# Patient Record
Sex: FEMALE | Race: WHITE | NOT HISPANIC OR LATINO | Employment: FULL TIME | URBAN - NONMETROPOLITAN AREA
[De-identification: names, ages, dates, MRNs, and addresses within clinical notes are randomized per-mention and may not be internally consistent; named-entity substitution may affect disease eponyms.]

---

## 2018-05-24 ENCOUNTER — OFFICE VISIT (OUTPATIENT)
Dept: SURGERY | Facility: CLINIC | Age: 53
End: 2018-05-24

## 2018-05-24 VITALS
BODY MASS INDEX: 26.11 KG/M2 | WEIGHT: 133 LBS | TEMPERATURE: 98 F | HEIGHT: 60 IN | RESPIRATION RATE: 16 BRPM | SYSTOLIC BLOOD PRESSURE: 147 MMHG | DIASTOLIC BLOOD PRESSURE: 80 MMHG | HEART RATE: 85 BPM

## 2018-05-24 DIAGNOSIS — Z12.11 ENCOUNTER FOR SCREENING COLONOSCOPY: Primary | ICD-10-CM

## 2018-05-24 PROCEDURE — S0285 CNSLT BEFORE SCREEN COLONOSC: HCPCS | Performed by: SURGERY

## 2018-05-24 RX ORDER — AMOXICILLIN AND CLAVULANATE POTASSIUM 875; 125 MG/1; MG/1
TABLET, FILM COATED ORAL
Refills: 0 | Status: ON HOLD | COMMUNITY
Start: 2018-05-17 | End: 2018-09-10

## 2018-05-24 RX ORDER — OLOPATADINE HYDROCHLORIDE 2 MG/ML
SOLUTION/ DROPS OPHTHALMIC DAILY
COMMUNITY
End: 2021-10-29

## 2018-05-24 NOTE — PROGRESS NOTES
5/24/2018    Patient Information  Vidhya Chou  [address] [facility] [place] [address]  1965  [phone] (home)     Chief Complaint   Patient presents with   • Colonoscopy     Referred for Colonoscopy       HPI  Patient is a 52-year-old white female referred by cindy Damon nurse practitioner, for a screening colonoscopy.  She has occasional constipation but this is been her normal course.  There is no family history of colon cancer    Review of Systems:  The Past medical history, family history, social history, medication list, allergies, and Review of Systems has been reviewed and confirmed.    General: negative  Integumentary: negative  Eyes: discharge from eyes  ENT: earache  Respiratory: wheezing  Gastrointestinal: constipation  Cardiovascular: negative  Neurological: negative  Psychiatric: negative  Hematologic/Lymphatic: negative  Genitourinary: negative  Musculoskeletal: negative  Endocrine: negative  Breasts: negative      There is no problem list on file for this patient.        Past Medical History:   Diagnosis Date   • Asthma    • Hyperlipidemia          Past Surgical History:   Procedure Laterality Date   • TUBAL ABDOMINAL LIGATION           Family History   Problem Relation Age of Onset   • Hypertension Mother    • Heart disease Mother    • Diabetes Mother    • Hypertension Father    • Heart disease Father          Social History   Substance Use Topics   • Smoking status: Never Smoker   • Smokeless tobacco: Never Used   • Alcohol use Yes       Current Outpatient Prescriptions   Medication Sig Dispense Refill   • Levocetirizine Dihydrochloride (XYZAL PO) Take  by mouth.     • olopatadine (PATADAY) 0.2 % solution ophthalmic solution Daily.     • Pseudoephedrine HCl (SUDAFED 12 HOUR PO) Take  by mouth.     • amoxicillin-clavulanate (AUGMENTIN) 875-125 MG per tablet TK 1 T PO  BID  0     No current facility-administered medications for this visit.          Allergies  Patient has no known  "allergies.    /80   Pulse 85   Temp 98 °F (36.7 °C)   Resp 16   Ht 152.4 cm (60\")   Wt 60.3 kg (133 lb)   BMI 25.97 kg/m²      Physical Exam   Constitutional: She is oriented to person, place, and time. She appears well-developed and well-nourished. No distress.   HENT:   Head: Normocephalic.   Right Ear: External ear normal.   Left Ear: External ear normal.   Nose: Nose normal.   Mouth/Throat: Oropharynx is clear and moist.   Eyes: Conjunctivae and EOM are normal. Right eye exhibits no discharge. Left eye exhibits no discharge.   Neck: Normal range of motion. No JVD present. No tracheal deviation present. No thyromegaly present.   Cardiovascular: Normal rate, regular rhythm, normal heart sounds and intact distal pulses.  Exam reveals no gallop and no friction rub.    No murmur heard.  Pulmonary/Chest: Effort normal and breath sounds normal. No stridor. No respiratory distress. She has no wheezes. She has no rales. She exhibits no tenderness.   Abdominal: Soft. Bowel sounds are normal. She exhibits no distension and no mass. There is no tenderness. There is no rebound and no guarding. No hernia.   Genitourinary: Rectal exam shows guaiac negative stool.   Musculoskeletal: Normal range of motion. She exhibits no edema, tenderness or deformity.   Lymphadenopathy:     She has no cervical adenopathy.   Neurological: She is alert and oriented to person, place, and time. She has normal reflexes. She displays normal reflexes. No cranial nerve deficit. She exhibits normal muscle tone. Coordination normal.   Skin: Skin is warm and dry. No rash noted. She is not diaphoretic. No erythema. No pallor.   Psychiatric: She has a normal mood and affect. Her behavior is normal. Judgment and thought content normal.                 ASSESSMENT  In need of screening colonoscopy        PLAN    Screening colonoscopy.  Patient wants to wait until the fall to have this done.  She is instructed to call back at that time      I " advised the patient of the risks in continuing to use tobacco, and I provided this patient with smoking cessation educational materials.    During this visit, I spent less than minutes counseling the patient regarding smoking cessation.       Patient's Body mass index is 25.97 kg/m². BMI is within normal parameters. No follow-up required.           This document signed by Marco A Damon MD May 24, 2018 3:51 PM

## 2018-08-29 ENCOUNTER — PREP FOR SURGERY (OUTPATIENT)
Dept: OTHER | Facility: HOSPITAL | Age: 53
End: 2018-08-29

## 2018-08-29 DIAGNOSIS — Z12.11 ENCOUNTER FOR SCREENING COLONOSCOPY: Primary | ICD-10-CM

## 2018-09-07 ENCOUNTER — TELEPHONE (OUTPATIENT)
Dept: SURGERY | Facility: CLINIC | Age: 53
End: 2018-09-07

## 2018-09-07 NOTE — H&P
Vidhya Chou  [address] [place] [id number]  1965  [phone] (home)           Chief Complaint   Patient presents with   • Colonoscopy       Referred for Colonoscopy         HPI  Patient is a 52-year-old white female referred by cindy Damon nurse practitioner, for a screening colonoscopy.  She has occasional constipation but this is been her normal course.  There is no family history of colon cancer     Review of Systems:  The Past medical history, family history, social history, medication list, allergies, and Review of Systems has been reviewed and confirmed.     General: negative  Integumentary: negative  Eyes: discharge from eyes  ENT: earache  Respiratory: wheezing  Gastrointestinal: constipation  Cardiovascular: negative  Neurological: negative  Psychiatric: negative  Hematologic/Lymphatic: negative  Genitourinary: negative  Musculoskeletal: negative  Endocrine: negative  Breasts: negative        There is no problem list on file for this patient.           Medical History        Past Medical History:   Diagnosis Date   • Asthma     • Hyperlipidemia                 Surgical History         Past Surgical History:   Procedure Laterality Date   • TUBAL ABDOMINAL LIGATION                         Family History   Problem Relation Age of Onset   • Hypertension Mother     • Heart disease Mother     • Diabetes Mother     • Hypertension Father     • Heart disease Father                   Social History   Substance Use Topics   • Smoking status: Never Smoker   • Smokeless tobacco: Never Used   • Alcohol use Yes         Current Medications   Current Outpatient Prescriptions   Medication Sig Dispense Refill   • Levocetirizine Dihydrochloride (XYZAL PO) Take  by mouth.       • olopatadine (PATADAY) 0.2 % solution ophthalmic solution Daily.       • Pseudoephedrine HCl (SUDAFED 12 HOUR PO) Take  by mouth.       • amoxicillin-clavulanate (AUGMENTIN) 875-125 MG per tablet TK 1 T PO  BID   0      No current  "facility-administered medications for this visit.                Allergies  Patient has no known allergies.     /80   Pulse 85   Temp 98 °F (36.7 °C)   Resp 16   Ht 152.4 cm (60\")   Wt 60.3 kg (133 lb)   BMI 25.97 kg/m²       Physical Exam   Constitutional: She is oriented to person, place, and time. She appears well-developed and well-nourished. No distress.   HENT:   Head: Normocephalic.   Right Ear: External ear normal.   Left Ear: External ear normal.   Nose: Nose normal.   Mouth/Throat: Oropharynx is clear and moist.   Eyes: Conjunctivae and EOM are normal. Right eye exhibits no discharge. Left eye exhibits no discharge.   Neck: Normal range of motion. No JVD present. No tracheal deviation present. No thyromegaly present.   Cardiovascular: Normal rate, regular rhythm, normal heart sounds and intact distal pulses.  Exam reveals no gallop and no friction rub.    No murmur heard.  Pulmonary/Chest: Effort normal and breath sounds normal. No stridor. No respiratory distress. She has no wheezes. She has no rales. She exhibits no tenderness.   Abdominal: Soft. Bowel sounds are normal. She exhibits no distension and no mass. There is no tenderness. There is no rebound and no guarding. No hernia.   Genitourinary: Rectal exam shows guaiac negative stool.   Musculoskeletal: Normal range of motion. She exhibits no edema, tenderness or deformity.   Lymphadenopathy:     She has no cervical adenopathy.   Neurological: She is alert and oriented to person, place, and time. She has normal reflexes. She displays normal reflexes. No cranial nerve deficit. She exhibits normal muscle tone. Coordination normal.   Skin: Skin is warm and dry. No rash noted. She is not diaphoretic. No erythema. No pallor.   Psychiatric: She has a normal mood and affect. Her behavior is normal. Judgment and thought content normal.                     Assessment/Plan       ASSESSMENT  In need of screening " colonoscopy           PLAN     Screening colonoscopy.

## 2018-09-10 ENCOUNTER — ANESTHESIA (OUTPATIENT)
Dept: PERIOP | Facility: HOSPITAL | Age: 53
End: 2018-09-10

## 2018-09-10 ENCOUNTER — HOSPITAL ENCOUNTER (OUTPATIENT)
Facility: HOSPITAL | Age: 53
Setting detail: HOSPITAL OUTPATIENT SURGERY
Discharge: HOME | End: 2018-09-10
Attending: SURGERY | Admitting: ANESTHESIOLOGY

## 2018-09-10 ENCOUNTER — ANESTHESIA EVENT (OUTPATIENT)
Dept: PERIOP | Facility: HOSPITAL | Age: 53
End: 2018-09-10

## 2018-09-10 VITALS
BODY MASS INDEX: 25.52 KG/M2 | HEIGHT: 60 IN | HEART RATE: 70 BPM | RESPIRATION RATE: 18 BRPM | DIASTOLIC BLOOD PRESSURE: 88 MMHG | TEMPERATURE: 97 F | SYSTOLIC BLOOD PRESSURE: 152 MMHG | OXYGEN SATURATION: 98 % | WEIGHT: 130 LBS

## 2018-09-10 PROBLEM — Z12.11 ENCOUNTER FOR SCREENING COLONOSCOPY: Status: RESOLVED | Noted: 2018-08-29 | Resolved: 2018-09-10

## 2018-09-10 PROCEDURE — 45378 DIAGNOSTIC COLONOSCOPY: CPT | Performed by: SURGERY

## 2018-09-10 PROCEDURE — 25010000002 PROPOFOL 1000 MG/ML EMULSION: Performed by: NURSE ANESTHETIST, CERTIFIED REGISTERED

## 2018-09-10 PROCEDURE — 25010000002 PROPOFOL 10 MG/ML EMULSION: Performed by: NURSE ANESTHETIST, CERTIFIED REGISTERED

## 2018-09-10 RX ORDER — FENTANYL CITRATE 50 UG/ML
50 INJECTION, SOLUTION INTRAMUSCULAR; INTRAVENOUS
Status: CANCELLED | OUTPATIENT
Start: 2018-09-10

## 2018-09-10 RX ORDER — LIDOCAINE HYDROCHLORIDE 20 MG/ML
INJECTION, SOLUTION INFILTRATION; PERINEURAL AS NEEDED
Status: DISCONTINUED | OUTPATIENT
Start: 2018-09-10 | End: 2018-09-10 | Stop reason: SURG

## 2018-09-10 RX ORDER — IPRATROPIUM BROMIDE AND ALBUTEROL SULFATE 2.5; .5 MG/3ML; MG/3ML
3 SOLUTION RESPIRATORY (INHALATION) ONCE AS NEEDED
Status: CANCELLED | OUTPATIENT
Start: 2018-09-10

## 2018-09-10 RX ORDER — ONDANSETRON 2 MG/ML
4 INJECTION INTRAMUSCULAR; INTRAVENOUS ONCE AS NEEDED
Status: CANCELLED | OUTPATIENT
Start: 2018-09-10

## 2018-09-10 RX ORDER — SODIUM CHLORIDE 0.9 % (FLUSH) 0.9 %
1-10 SYRINGE (ML) INJECTION AS NEEDED
Status: DISCONTINUED | OUTPATIENT
Start: 2018-09-10 | End: 2018-09-10 | Stop reason: HOSPADM

## 2018-09-10 RX ORDER — SODIUM CHLORIDE, SODIUM LACTATE, POTASSIUM CHLORIDE, CALCIUM CHLORIDE 600; 310; 30; 20 MG/100ML; MG/100ML; MG/100ML; MG/100ML
125 INJECTION, SOLUTION INTRAVENOUS CONTINUOUS
Status: DISCONTINUED | OUTPATIENT
Start: 2018-09-10 | End: 2018-09-10 | Stop reason: HOSPADM

## 2018-09-10 RX ORDER — PROPOFOL 10 MG/ML
VIAL (ML) INTRAVENOUS AS NEEDED
Status: DISCONTINUED | OUTPATIENT
Start: 2018-09-10 | End: 2018-09-10 | Stop reason: SURG

## 2018-09-10 RX ADMIN — SODIUM CHLORIDE, POTASSIUM CHLORIDE, SODIUM LACTATE AND CALCIUM CHLORIDE: 600; 310; 30; 20 INJECTION, SOLUTION INTRAVENOUS at 10:43

## 2018-09-10 RX ADMIN — PROPOFOL 80 MG: 10 INJECTION, EMULSION INTRAVENOUS at 10:44

## 2018-09-10 RX ADMIN — LIDOCAINE HYDROCHLORIDE 40 MG: 20 INJECTION, SOLUTION INFILTRATION; PERINEURAL at 10:44

## 2018-09-10 RX ADMIN — PROPOFOL 150 MCG/KG/MIN: 10 INJECTION, EMULSION INTRAVENOUS at 10:44

## 2018-09-10 NOTE — ANESTHESIA POSTPROCEDURE EVALUATION
Patient: Vidhya Chou    Procedure Summary     Date:  09/10/18 Room / Location:  [facility] OR  /  [facility] OR    Anesthesia Start:  1043 Anesthesia Stop:  1104    Procedure:  COLONOSCOPY (N/A ) Diagnosis:       Encounter for screening colonoscopy      (Encounter for screening colonoscopy [Z12.11])    Surgeon:  Marco A Damon MD Provider:  Derick Chase MD    Anesthesia Type:  general ASA Status:  2          Anesthesia Type: general  Last vitals  BP   157/80 (09/10/18 0919)   Temp   97.7 °F (36.5 °C) (09/10/18 0919)   Pulse   81 (09/10/18 0919)   Resp   20 (09/10/18 0919)     SpO2   98 % (09/10/18 0919)     Post Anesthesia Care and Evaluation    Patient location during evaluation: PHASE II  Patient participation: complete - patient participated  Level of consciousness: awake and alert  Pain score: 1  Pain management: adequate  Airway patency: patent  Anesthetic complications: No anesthetic complications  PONV Status: controlled  Cardiovascular status: acceptable  Respiratory status: acceptable  Hydration status: acceptable

## 2018-09-10 NOTE — ANESTHESIA PREPROCEDURE EVALUATION
Anesthesia Evaluation     Patient summary reviewed and Nursing notes reviewed   no history of anesthetic complications:  NPO Solid Status: > 8 hours             Airway   Mallampati: II  TM distance: >3 FB  Neck ROM: full  no difficulty expected  Dental - normal exam     Pulmonary - normal exam   (+) asthma,   (-) not a smoker  Cardiovascular - normal exam  Exercise tolerance: good (4-7 METS)    NYHA Classification: II    (+) hyperlipidemia,   (-) hypertension, past MI, dysrhythmias, angina, CHF      Neuro/Psych- negative ROS  (-) seizures, CVA  GI/Hepatic/Renal/Endo - negative ROS   (-) GERD, liver disease, no renal disease, diabetes, hypothyroidism    Musculoskeletal (-) negative ROS    Abdominal  - normal exam    Bowel sounds: normal.   Substance History - negative use     OB/GYN negative ob/gyn ROS         Other - negative ROS       (-) arthritis                  Anesthesia Plan    ASA 2     general     intravenous induction   Anesthetic plan, all risks, benefits, and alternatives have been provided, discussed and informed consent has been obtained with: patient and spouse/significant other.  Use of blood products discussed with patient and spouse/significant other  Consented to blood products.

## 2018-09-10 NOTE — OP NOTE
Vidhya Effie  9/10/2018      Operative Progress Note:    Surgeon and Assistant: Dr. Damon    Pre-Operative Diagnosis: screening colonoscopy    Post-Operative Diagnosis: normal screening colonoscopy    Procedure(s):  colonoscopy to cecum    Type of Anesthesia Administered: IV general    Estimated Blood Loss: Minimal    Blood Products: None    Specimen Obtained/Removed:None    Complication(s):  None    Graft/Implant/Prosthetics/Implanted Device/Transplants: None    Indication: patient's a 52-year-old white female    Findings: colon normal in its entirety    Operative Report:  Patient was taken to the operating room and placed in a left lateral decubitus position.  IV sedation was given.  Colonoscope was introduced and passed all the way to cecum.  The scope was slowly withdrawn.  Cecum, ascending colon, hepatic flexure, transverse colon, splenic flexure, descending colon, sigmoid colon, and rectum were all normal.  Digital rectal exam was unremarkable.  The procedures and terminated.  Patient tolerated procedure very well and was returned recovery room in satisfactory condition    Patient will need a repeat colonoscopy in 10 years or less.       Electronically Signed by: Marco A Damon MD        Dictated Utilizing goCatch

## 2019-11-19 ENCOUNTER — OFFICE VISIT (OUTPATIENT)
Dept: UROLOGY | Facility: CLINIC | Age: 54
End: 2019-11-19

## 2019-11-19 VITALS
DIASTOLIC BLOOD PRESSURE: 80 MMHG | BODY MASS INDEX: 26.54 KG/M2 | HEIGHT: 62 IN | WEIGHT: 144.2 LBS | SYSTOLIC BLOOD PRESSURE: 120 MMHG

## 2019-11-19 DIAGNOSIS — N39.0 URINARY TRACT INFECTION WITHOUT HEMATURIA, SITE UNSPECIFIED: Primary | ICD-10-CM

## 2019-11-19 DIAGNOSIS — R35.0 URINE FREQUENCY: ICD-10-CM

## 2019-11-19 LAB
BILIRUB BLD-MCNC: NEGATIVE MG/DL
CLARITY, POC: ABNORMAL
COLOR UR: YELLOW
GLUCOSE UR STRIP-MCNC: NEGATIVE MG/DL
KETONES UR QL: NEGATIVE
LEUKOCYTE EST, POC: NEGATIVE
NITRITE UR-MCNC: NEGATIVE MG/ML
PH UR: 6 [PH] (ref 5–8)
PROT UR STRIP-MCNC: NEGATIVE MG/DL
RBC # UR STRIP: NEGATIVE /UL
SP GR UR: 1.03 (ref 1–1.03)
UROBILINOGEN UR QL: NORMAL

## 2019-11-19 PROCEDURE — 87086 URINE CULTURE/COLONY COUNT: CPT | Performed by: NURSE PRACTITIONER

## 2019-11-19 PROCEDURE — 99204 OFFICE O/P NEW MOD 45 MIN: CPT | Performed by: NURSE PRACTITIONER

## 2019-11-19 PROCEDURE — 81003 URINALYSIS AUTO W/O SCOPE: CPT | Performed by: NURSE PRACTITIONER

## 2019-11-19 RX ORDER — NITROFURANTOIN 25; 75 MG/1; MG/1
100 CAPSULE ORAL DAILY
Qty: 56 CAPSULE | Refills: 3 | Status: SHIPPED | OUTPATIENT
Start: 2019-11-19 | End: 2021-10-29

## 2019-11-19 NOTE — PROGRESS NOTES
Chief Complaint:          Chief Complaint   Patient presents with   • urine frequency       HPI:   53 y.o. female.    Pt is seen for Urinary Frequency and Frequent UTI's.       She reports Recurrent urinary tract infections, and most recently two infections for which she was treated with IM rocephin injections by her primary care physician. She is unsure of what the culture results were.     Patient has been referred to us and diagnosed with recurrent urinary tract infections. She describes issues with frequency, urgency and dysuria when she has infections. She has low back pain, nausea and generalized malaise.    Upon Exam today, she has some urine frequency. Her Urine dipstick is positive for 1+ leuks estrace. It is negative for nitrites and microscopic blood. She denies dysuria, fever, chills, N/V/D. She has no gross hematuria. She reports doing relatively well and feels better compared to how she has felt in the past.    She is a  with no significant medical history besides HLD and asthma. She has had no major surgeries. She has issues with constipation, bloating and urine frequency for which she has tried Oxybutynin and does not like the side effects of dry mouth. She still uses it PRN.     She has has never had a lower/upper tract investigation. Will initiate that.    Past Medical History:        Past Medical History:   Diagnosis Date   • Asthma    • Hyperlipidemia          Current Meds:     Current Outpatient Medications   Medication Sig Dispense Refill   • Pseudoephedrine HCl (SUDAFED 12 HOUR PO) Take  by mouth.     • Mirabegron ER (MYRBETRIQ) 25 MG tablet sustained-release 24 hour 24 hr tablet Take 1 tablet by mouth Daily. 30 tablet 3   • nitrofurantoin, macrocrystal-monohydrate, (MACROBID) 100 MG capsule Take 1 capsule by mouth Daily. 56 capsule 3   • olopatadine (PATADAY) 0.2 % solution ophthalmic solution Daily.       No current facility-administered medications for this visit.         Allergies:       No Known Allergies     Past Surgical History:     Past Surgical History:   Procedure Laterality Date   • COLONOSCOPY N/A 9/10/2018    Procedure: COLONOSCOPY;  Surgeon: Marco A Damon MD;  Location: [facility];  Service: Gastroenterology   • EYE SURGERY     • TUBAL ABDOMINAL LIGATION           Social History:     Social History     Socioeconomic History   • Marital status:      Spouse name: Not on file   • Number of children: Not on file   • Years of education: Not on file   • Highest education level: Not on file   Tobacco Use   • Smoking status: Never Smoker   • Smokeless tobacco: Never Used   Substance and Sexual Activity   • Alcohol use: Yes     Comment: occasional   • Drug use: No   • Sexual activity: Defer       Family History:     Family History   Problem Relation Age of Onset   • Hypertension Mother    • Heart disease Mother    • Diabetes Mother    • Kidney disease Mother    • Hypertension Father    • Heart disease Father    • Kidney cancer Father        Review of Systems:     Review of Systems   Constitutional: Negative for activity change, appetite change, chills, diaphoresis, fatigue, fever and unexpected weight change.   HENT: Negative for congestion, dental problem, drooling, ear discharge, ear pain, facial swelling, sneezing, sore throat, tinnitus and trouble swallowing.    Eyes: Negative for pain, discharge, redness and itching.   Respiratory: Negative for apnea, choking, chest tightness and shortness of breath.    Cardiovascular: Negative for chest pain, palpitations and leg swelling.   Gastrointestinal: Positive for abdominal distention and constipation. Negative for abdominal pain, blood in stool, diarrhea, nausea, rectal pain and vomiting.   Endocrine: Negative for cold intolerance, heat intolerance and polyphagia.   Genitourinary: Positive for flank pain and frequency. Negative for decreased urine volume, difficulty urinating, dyspareunia, dysuria, enuresis, genital sores, menstrual  problem, pelvic pain, urgency, vaginal bleeding, vaginal discharge and vaginal pain.   Musculoskeletal: Positive for back pain. Negative for arthralgias, gait problem, joint swelling and neck pain.   Skin: Negative for color change, pallor, rash and wound.   Allergic/Immunologic: Negative for environmental allergies and food allergies.   Neurological: Positive for headaches. Negative for dizziness, facial asymmetry and weakness.   Hematological: Negative for adenopathy. Does not bruise/bleed easily.   Psychiatric/Behavioral: Negative for agitation, behavioral problems and confusion. The patient is nervous/anxious. The patient is not hyperactive.    All other systems reviewed and are negative.      Physical Exam:     Physical Exam   Constitutional: She is oriented to person, place, and time. She appears well-developed and well-nourished. No distress.   HENT:   Head: Normocephalic and atraumatic.   Eyes: Conjunctivae and EOM are normal. Pupils are equal, round, and reactive to light. Right eye exhibits no discharge. Left eye exhibits no discharge. No scleral icterus.   Neck: Normal range of motion. Neck supple. No JVD present. No tracheal deviation present. No thyromegaly present.   Cardiovascular: Normal rate, regular rhythm, normal heart sounds and intact distal pulses. Exam reveals no gallop and no friction rub.   No murmur heard.  Pulmonary/Chest: Effort normal and breath sounds normal. No stridor. No respiratory distress. She has no wheezes. She has no rales. She exhibits no tenderness.   Abdominal: Soft. Bowel sounds are normal. She exhibits distension. She exhibits no mass. There is tenderness. There is no rebound and no guarding. No hernia.   Genitourinary: Vagina normal. No vaginal discharge found.   Musculoskeletal: Normal range of motion. She exhibits no edema, tenderness or deformity.   Neurological: She is alert and oriented to person, place, and time. She displays normal reflexes. No cranial nerve  deficit or sensory deficit. She exhibits normal muscle tone. Coordination normal.   Skin: Skin is warm. Capillary refill takes less than 2 seconds. Rash noted. She is not diaphoretic. No erythema. No pallor.   Psychiatric: She has a normal mood and affect. Her behavior is normal.           Procedure:       Assessment/Plan:     Urine Frequency/Frequent Urinary Tract Infections: Discused Frequent UTI's with Patient and the need to get her infections properly treated by getting a urine culture always prior to initiatiating any antibiotic therapy.     She has some urine frequency. Her Urine dipstick is positive for 1+ leuks estrace. It is negative for nitrites and microscopic blood. She denies dysuria, fever, chills, N/V/D. She has no gross hematuria. She reports doing relatively well and feels better compared to how she has felt in the past. Will send her Urine dip for culture today.    We discussed the types of organisms that are found in the urinary tract indicating that the vast majority are results of the patient's own gastrointestinal lobo.  We discussed how many of the antibiotics that are utilized can actually exacerbate these infections by creating resistant organisms and there is only a very few antibiotics that are concentrated in the urine and do not affect the rectal reservoir nor cause recurrent yeast vaginitis.      We discussed the risk factors for recurrent infections being intercourse in younger patients and atrophic changes in older patients.  We discussed the symptoms that are found including pain, pressure, burning, frequency, urgency suprapubic pain and painful intercourse.      I discussed upper tract symptoms including fevers, chills, and indicated the workup would be much more aggressive if the patient were to present with recurrent infections in the face of upper tract symptomatology such as fever.        I recommend concomitant probiotics with treatment with antibiotics to protect the rectal  reservoir including over-the-counter yogurt preparations to paola oral pills containing the appropriate probiotics.     Patient agreeable to starting this. Discussed Suppressive Abx therapy with  Nitrofurantoin. Discussed using Probiotic daily, Starting Myrbetriq  and to maintain her perineal hygiene. She is to prevent constipation increasing fiber in her diet, Miralax daily and increasing PO fluid intake.      Will hold off on Imaging at this time and lower tract investigation.    Will see her back in Three months.              Patient reports that she is not currently experiencing any symptoms of urinary incontinence.      Patient's Body mass index is 26.37 kg/m². BMI is within normal parameters. No follow-up required..                   This document has been electronically signed by Griselda Cheng-Akwa, APRN November 19, 2019 8:03 PM

## 2019-11-20 LAB — BACTERIA SPEC AEROBE CULT: NORMAL

## 2019-12-31 ENCOUNTER — HOSPITAL ENCOUNTER (OUTPATIENT)
Dept: GENERAL RADIOLOGY | Facility: HOSPITAL | Age: 54
Discharge: HOME | End: 2019-12-31
Admitting: UROLOGY

## 2019-12-31 ENCOUNTER — OFFICE VISIT (OUTPATIENT)
Dept: UROLOGY | Facility: CLINIC | Age: 54
End: 2019-12-31

## 2019-12-31 VITALS — HEIGHT: 62 IN | WEIGHT: 144 LBS | BODY MASS INDEX: 26.5 KG/M2

## 2019-12-31 DIAGNOSIS — N20.0 KIDNEY STONE: Primary | ICD-10-CM

## 2019-12-31 DIAGNOSIS — N32.81 DETRUSOR INSTABILITY: ICD-10-CM

## 2019-12-31 LAB
ALBUMIN SERPL-MCNC: 4.5 G/DL (ref 3.5–5.2)
ALBUMIN/GLOB SERPL: 1.3 G/DL
ALP SERPL-CCNC: 88 U/L (ref 39–117)
ALT SERPL W P-5'-P-CCNC: 29 U/L (ref 1–33)
ANION GAP SERPL CALCULATED.3IONS-SCNC: 11.2 MMOL/L (ref 5–15)
AST SERPL-CCNC: 15 U/L (ref 1–32)
BILIRUB SERPL-MCNC: 0.4 MG/DL (ref 0.2–1.2)
BUN BLD-MCNC: 9 MG/DL (ref 6–20)
BUN/CREAT SERPL: 13.6 (ref 7–25)
CALCIUM SPEC-SCNC: 9.7 MG/DL (ref 8.6–10.5)
CHLORIDE SERPL-SCNC: 103 MMOL/L (ref 98–107)
CHOLEST SERPL-MCNC: 256 MG/DL (ref 0–200)
CO2 SERPL-SCNC: 23.8 MMOL/L (ref 22–29)
CREAT BLD-MCNC: 0.66 MG/DL (ref 0.57–1)
GFR SERPL CREATININE-BSD FRML MDRD: 93 ML/MIN/1.73
GLOBULIN UR ELPH-MCNC: 3.6 GM/DL
GLUCOSE BLD-MCNC: 95 MG/DL (ref 65–99)
HBA1C MFR BLD: 5.6 % (ref 4.8–5.6)
HDLC SERPL-MCNC: 42 MG/DL (ref 40–60)
LDLC SERPL CALC-MCNC: 172 MG/DL (ref 0–100)
LDLC/HDLC SERPL: 4.1 {RATIO}
POTASSIUM BLD-SCNC: 4.6 MMOL/L (ref 3.5–5.2)
PROT SERPL-MCNC: 8.1 G/DL (ref 6–8.5)
SODIUM BLD-SCNC: 138 MMOL/L (ref 136–145)
TRIGL SERPL-MCNC: 208 MG/DL (ref 0–150)
VLDLC SERPL-MCNC: 41.6 MG/DL (ref 5–40)

## 2019-12-31 PROCEDURE — 74018 RADEX ABDOMEN 1 VIEW: CPT | Performed by: RADIOLOGY

## 2019-12-31 PROCEDURE — 74018 RADEX ABDOMEN 1 VIEW: CPT

## 2019-12-31 PROCEDURE — 80061 LIPID PANEL: CPT | Performed by: UROLOGY

## 2019-12-31 PROCEDURE — 80053 COMPREHEN METABOLIC PANEL: CPT | Performed by: UROLOGY

## 2019-12-31 PROCEDURE — 83036 HEMOGLOBIN GLYCOSYLATED A1C: CPT | Performed by: UROLOGY

## 2019-12-31 PROCEDURE — 99203 OFFICE O/P NEW LOW 30 MIN: CPT | Performed by: UROLOGY

## 2019-12-31 PROCEDURE — 36415 COLL VENOUS BLD VENIPUNCTURE: CPT | Performed by: UROLOGY

## 2020-01-06 PROBLEM — N32.81 DETRUSOR INSTABILITY: Status: ACTIVE | Noted: 2020-01-06

## 2020-01-06 PROBLEM — N20.0 KIDNEY STONE: Status: ACTIVE | Noted: 2020-01-06

## 2020-01-14 ENCOUNTER — OFFICE VISIT (OUTPATIENT)
Dept: UROLOGY | Facility: CLINIC | Age: 55
End: 2020-01-14

## 2020-01-14 VITALS — HEIGHT: 62 IN | WEIGHT: 145 LBS | BODY MASS INDEX: 26.68 KG/M2

## 2020-01-14 DIAGNOSIS — N20.0 KIDNEY STONE: Primary | ICD-10-CM

## 2020-01-14 PROCEDURE — 99213 OFFICE O/P EST LOW 20 MIN: CPT | Performed by: UROLOGY

## 2020-01-14 NOTE — PROGRESS NOTES
Chief Complaint:          Chief Complaint   Patient presents with   • Nephrolithiasis       HPI:   54 y.o. female returns today.  She has no stones.  She drinks coffee we discussed her situation and her complex metabolic profile was normal.  Her total cholesterol was 256 with a triglyceride of 208 and HDL of 42 with an LDL of 172 her A1c was 5.6 I recommended Crestor at a dose of 10 mg with fish oil.  I discussed the rationale behind this at length she is in to see her primary care physician I will see her back on an as-needed basis.      Past Medical History:        Past Medical History:   Diagnosis Date   • Asthma    • Hyperlipidemia          Current Meds:     Current Outpatient Medications   Medication Sig Dispense Refill   • Mirabegron ER (MYRBETRIQ) 25 MG tablet sustained-release 24 hour 24 hr tablet Take 1 tablet by mouth Daily. 30 tablet 3   • nitrofurantoin, macrocrystal-monohydrate, (MACROBID) 100 MG capsule Take 1 capsule by mouth Daily. 56 capsule 3   • olopatadine (PATADAY) 0.2 % solution ophthalmic solution Daily.     • Pseudoephedrine HCl (SUDAFED 12 HOUR PO) Take  by mouth.       No current facility-administered medications for this visit.         Allergies:      No Known Allergies     Past Surgical History:     Past Surgical History:   Procedure Laterality Date   • COLONOSCOPY N/A 9/10/2018    Procedure: COLONOSCOPY;  Surgeon: Marco A Damon MD;  Location: [facility];  Service: Gastroenterology   • EYE SURGERY     • TUBAL ABDOMINAL LIGATION           Social History:     Social History     Socioeconomic History   • Marital status:      Spouse name: Not on file   • Number of children: Not on file   • Years of education: Not on file   • Highest education level: Not on file   Tobacco Use   • Smoking status: Never Smoker   • Smokeless tobacco: Never Used   Substance and Sexual Activity   • Alcohol use: Yes     Comment: occasional   • Drug use: No   • Sexual activity: Defer       Family History:        Family History   Problem Relation Age of Onset   • Hypertension Mother    • Heart disease Mother    • Diabetes Mother    • Kidney disease Mother    • Hypertension Father    • Heart disease Father    • Kidney cancer Father        Review of Systems:     Review of Systems   Constitutional: Negative.  Negative for activity change, appetite change, chills, diaphoresis, fatigue and unexpected weight change.   HENT: Negative for congestion, dental problem, drooling, ear discharge, ear pain, facial swelling, hearing loss, mouth sores, nosebleeds, postnasal drip, rhinorrhea, sinus pressure, sneezing, sore throat, tinnitus, trouble swallowing and voice change.    Eyes: Negative.  Negative for photophobia, pain, discharge, redness, itching and visual disturbance.   Respiratory: Negative.  Negative for apnea, cough, choking, chest tightness, shortness of breath, wheezing and stridor.    Cardiovascular: Negative.  Negative for chest pain, palpitations and leg swelling.   Gastrointestinal: Negative.  Negative for abdominal distention, abdominal pain, anal bleeding, blood in stool, constipation, diarrhea, nausea, rectal pain and vomiting.   Endocrine: Negative.  Negative for cold intolerance, heat intolerance, polydipsia, polyphagia and polyuria.   Musculoskeletal: Negative.  Negative for arthralgias, back pain, gait problem, joint swelling, myalgias, neck pain and neck stiffness.   Skin: Negative.  Negative for color change, pallor, rash and wound.   Allergic/Immunologic: Negative.  Negative for environmental allergies, food allergies and immunocompromised state.   Neurological: Negative.  Negative for dizziness, tremors, seizures, syncope, facial asymmetry, speech difficulty, weakness, light-headedness, numbness and headaches.   Hematological: Negative.  Negative for adenopathy. Does not bruise/bleed easily.   Psychiatric/Behavioral: Negative for agitation, behavioral problems, confusion, decreased concentration, dysphoric  mood, hallucinations, self-injury, sleep disturbance and suicidal ideas. The patient is not nervous/anxious and is not hyperactive.    All other systems reviewed and are negative.      Physical Exam:     Physical Exam   Constitutional: She appears well-developed and well-nourished.   HENT:   Head: Normocephalic and atraumatic.   Right Ear: External ear normal.   Left Ear: External ear normal.   Mouth/Throat: Oropharynx is clear and moist.   Eyes: Pupils are equal, round, and reactive to light. Conjunctivae are normal.   Cardiovascular: Normal rate, regular rhythm, normal heart sounds and intact distal pulses.   Pulmonary/Chest: Effort normal and breath sounds normal.   Abdominal: Soft. Bowel sounds are normal. She exhibits no distension and no mass. There is no tenderness. There is no rebound and no guarding.   Genitourinary: No vaginal discharge found.   Musculoskeletal: Normal range of motion.   Neurological: She is alert. She has normal reflexes.   Skin: Skin is warm and dry.   Psychiatric: She has a normal mood and affect. Her behavior is normal. Judgment and thought content normal.       I have reviewed the following portions of the patient's history: allergies, current medications, past family history, past medical history, past social history, past surgical history, problem list and ROS and confirm it's accurate.      Procedure:       Assessment/Plan:   Renal calculus-we discussed the presence of the stone we discussed the various therapeutic options available including percutaneous nephrostolithotomy, lithotripsy.  We discussed the risks of lithotripsy including the passage of stones the development of a large string of stones in the distal ureter known as Steinstrasse.  In the 3% incidence of that we will need to proceed with a ureteroscopy for obstructing fragments.  Extremely rare incidence of renal hematoma.  And the significance of this.  We discussed the absolute relative indicators for intervention  including the presence of sepsis, and pain we cannot control is the primary need for urgent intervention.  We discussed placement of a stent if indicated and the management of the stent as well.  She is currently free of stones            Patient's Body mass index is 26.34 kg/m². BMI is above normal parameters. Recommendations include: educational material.              This document has been electronically signed by LOUIS MACARIO MD January 14, 2020 2:57 PM

## 2021-03-18 ENCOUNTER — BULK ORDERING (OUTPATIENT)
Dept: CASE MANAGEMENT | Facility: OTHER | Age: 56
End: 2021-03-18

## 2021-03-18 DIAGNOSIS — Z23 IMMUNIZATION DUE: ICD-10-CM

## 2021-06-23 DIAGNOSIS — N39.0 URINARY TRACT INFECTION WITHOUT HEMATURIA, SITE UNSPECIFIED: ICD-10-CM

## 2021-06-24 RX ORDER — MIRABEGRON 25 MG/1
TABLET, FILM COATED, EXTENDED RELEASE ORAL
Qty: 30 TABLET | Refills: 3 | Status: SHIPPED | OUTPATIENT
Start: 2021-06-24 | End: 2021-10-29

## 2021-10-12 ENCOUNTER — TRANSCRIBE ORDERS (OUTPATIENT)
Dept: ADMINISTRATIVE | Facility: HOSPITAL | Age: 56
End: 2021-10-12

## 2021-10-12 ENCOUNTER — TRANSCRIBE ORDERS (OUTPATIENT)
Dept: LAB | Facility: HOSPITAL | Age: 56
End: 2021-10-12

## 2021-10-12 DIAGNOSIS — M54.50 LOW BACK PAIN, UNSPECIFIED BACK PAIN LATERALITY, UNSPECIFIED CHRONICITY, UNSPECIFIED WHETHER SCIATICA PRESENT: Primary | ICD-10-CM

## 2021-10-12 DIAGNOSIS — M54.40 LOW BACK PAIN WITH SCIATICA, SCIATICA LATERALITY UNSPECIFIED, UNSPECIFIED BACK PAIN LATERALITY, UNSPECIFIED CHRONICITY: Primary | ICD-10-CM

## 2021-10-26 PROBLEM — E78.5 DYSLIPIDEMIA: Status: ACTIVE | Noted: 2021-10-26

## 2021-10-26 PROBLEM — R60.0 LOCALIZED EDEMA: Status: ACTIVE | Noted: 2021-10-26

## 2021-10-26 NOTE — PROGRESS NOTES
Subjective:     Encounter Date:10/29/2021    Patient ID: Vidhya Chou is a 55 y.o.  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    SELF REFERRED  HEALTHCARE PROVIDER: BRIANNA Wood  UROLOGIST: Ashwin Alvarado MD    Chief Complaint:   Chief Complaint   Patient presents with   • Shortness of Breath   • Edema     Problem List:  1. Localized lower extremity edema  a. Acceptable ECG October 2021  b. Residual class I symptoms  2. Dyslipidemia, intolerant to rosuvastatin  3. Occasional GERD  4. At risk for sleep apnea with snoring and daytime sleepiness  5. Covid February 2021 with fatigue, nasal congestion, fever, symptoms lasted for 10 days  6. History of kidney stones and UTI's  7. Surgical history: Tubal ligation    No Known Allergies     Current Outpatient Medications:   •  hydroCHLOROthiazide (HYDRODIURIL) 25 MG tablet, Take 25 mg by mouth Daily., Disp: , Rfl:   •  Omega-3 Fatty Acids (fish oil) 1000 MG capsule capsule, Take 1,000 mg by mouth 2 (Two) Times a Day With Meals., Disp: , Rfl:     History of Present Illness  This is a 55-year-old white female who presents to establish care for localized edema.  She has had lower extremity edema for about 2 years but has never had a lower extremity venous duplex.  She was given hydrochlorothiazide but does not like to take it.  She was on Crestor for her cholesterol but had myalgias and switched to fish oil.  She wants to get her labs rechecked to see if it helped.  She has never had any stress test, heart caths, heart monitors, echocardiograms, MIs, CVAs, TIAs, seizures, DVTs, PEs, rheumatic fever, thyroid dysfunction, kidney dysfunction, or claudication.  She does have some lumbar back pain.  Rarely she will have GERD and Tums will help.  She has had this pulling sensation sometimes in her throat but denies any dysphagia.  She brought her laboratory testing from 2020 with her today for us to  review.  She has not had any recent labs.  She denies any hypertension, diabetes, PIH, preeclampsia, or gestational diabetes.  She had a [facility] screening in 2019 that apparently was normal.  She has never been a smoker.  Both of her parents have had strokes.  The patient had Covid in February 2021 and had associated nasal congestion, fever, and fatigue.  She did not lose her taste and smell.  Her symptoms lasted for about 10 days.  She had her labs drawn and she still has antibodies.  She has not had her COVID vaccinations.  She has concerns because she has had some weight gain and attributes some of this to menopause.  She is going to go soon to have her hormone levels checked.  She snores and has daytime sleepiness but has never had a sleep study.  She denies any chest pain, palpitations, presyncope, or syncope.  She has some mild shortness of breath on exertion but is not overly active.  She says that she can walk and do activities without much difficulty but she just does not engage in any routine exercise. She does not feel that her ankles swell but it is in her legs and abdomen. She has complained of weight gain.     Cardiovascular Disease Risk Factors  hyperlipidemia    Social History     Socioeconomic History   • Marital status:    Tobacco Use   • Smoking status: Passive Smoke Exposure - Never Smoker   • Smokeless tobacco: Never Used   Substance and Sexual Activity   • Alcohol use: Yes     Comment: occasional   • Drug use: No   • Sexual activity: Defer       Family History   Problem Relation Age of Onset   • Hypertension Mother    • Heart disease Mother    • Diabetes Mother    • Kidney disease Mother    • Hypertension Father    • Heart disease Father    • Kidney cancer Father        Review of Systems   Constitutional: Positive for weight loss.   Eyes: Negative.    Cardiovascular: Positive for dyspnea on exertion and leg swelling. Negative for chest pain, claudication, irregular heartbeat,  "near-syncope, palpitations and syncope.   Respiratory: Positive for sleep disturbances due to breathing and snoring.    Endocrine: Negative.    Hematologic/Lymphatic: Negative.    Skin: Negative.    Musculoskeletal: Positive for neck pain and stiffness.   Gastrointestinal: Positive for heartburn (rare).   Neurological: Positive for excessive daytime sleepiness. Negative for focal weakness, seizures and weakness.   Psychiatric/Behavioral: Negative.    Allergic/Immunologic: Positive for environmental allergies.      Obtained and negative except as outlined in problem list and HPI.      ECG 12 Lead    Date/Time: 10/29/2021 10:09 AM  Performed by: Godwin Denson MD  Authorized by: Godwin Denson MD   Rhythm comments: Normal sinus rhythm, low voltage QRS, borderline ECG, 72 bpm, QRS 88 ms,  ms,  ms, no prior ECGs to review                 Objective:       Vitals:    10/29/21 0927 10/29/21 0928 10/29/21 0929   BP: 153/89 143/85 137/84   BP Location: Right arm Right arm Left arm   Patient Position: Sitting Standing Sitting   Pulse: 79 81 79   SpO2: 97%     Weight: 68.5 kg (151 lb)     Height: 152.4 cm (60\")     Recheck blood pressure right arm sitting was 118/68   Body mass index is 29.49 kg/m².    Constitutional:       Appearance: Healthy appearance. Not in distress.   Eyes:      Funduscopic exam:     Right eye: AV nicking present.         Left eye: AV nicking present.   HENT:    Mouth/Throat:      Lips: Pink. No lesions.      Mouth: Mucous membranes are moist. No injury, lacerations, oral lesions or angioedema.      Dentition: Normal dentition. Does not have dentures. No dental tenderness, gingival swelling, dental caries, dental abscesses or gum lesions.      Tongue: No lesions. Tongue does not deviate from midline.      Palate: No mass and lesions.      Pharynx: Oropharynx is clear. Uvula midline. No pharyngeal swelling, oropharyngeal exudate, posterior oropharyngeal erythema or uvula swelling.      " Tonsils: No tonsillar exudate or tonsillar abscesses.   Neck:      Vascular: No JVR. JVD normal.   Pulmonary:      Effort: Pulmonary effort is normal.      Breath sounds: Normal breath sounds. No wheezing. No rhonchi. No rales.   Chest:      Chest wall: Not tender to palpatation.   Cardiovascular:      PMI at left midclavicular line. Normal rate. Regular rhythm. Normal S1. Normal S2.      Murmurs: There is a grade 1/6 systolic murmur at the URSB.      No gallop. No click. No rub.   Pulses:     Carotid: 2+ bilaterally.     Radial: 2+ bilaterally.     Femoral: 2+ bilaterally.     Dorsalis pedis: 2+ bilaterally.     Posterior tibial: 2+ bilaterally.  Edema:     Pretibial: bilateral trace edema of the pretibial area.  Abdominal:      General: Bowel sounds are normal.      Palpations: Abdomen is soft.      Tenderness: There is no abdominal tenderness.   Musculoskeletal: Normal range of motion.         General: No tenderness. Skin:     General: Skin is warm and dry.   Neurological:      General: No focal deficit present.      Mental Status: Alert and oriented to person, place and time.         Lab Review:   Results for orders placed or performed in visit on 12/31/19   Hemoglobin A1c    Specimen: Blood   Result Value Ref Range    Hemoglobin A1C 5.60 4.80 - 5.60 %   Comprehensive Metabolic Panel    Specimen: Blood   Result Value Ref Range    Glucose 95 65 - 99 mg/dL    BUN 9 6 - 20 mg/dL    Creatinine 0.66 0.57 - 1.00 mg/dL    Sodium 138 136 - 145 mmol/L    Potassium 4.6 3.5 - 5.2 mmol/L    Chloride 103 98 - 107 mmol/L    CO2 23.8 22.0 - 29.0 mmol/L    Calcium 9.7 8.6 - 10.5 mg/dL    Total Protein 8.1 6.0 - 8.5 g/dL    Albumin 4.50 3.50 - 5.20 g/dL    ALT (SGPT) 29 1 - 33 U/L    AST (SGOT) 15 1 - 32 U/L    Alkaline Phosphatase 88 39 - 117 U/L    Total Bilirubin 0.4 0.2 - 1.2 mg/dL    eGFR Non African Amer 93 >60 mL/min/1.73    Globulin 3.6 gm/dL    A/G Ratio 1.3 g/dL    BUN/Creatinine Ratio 13.6 7.0 - 25.0    Anion Gap 11.2  5.0 - 15.0 mmol/L   Lipid Panel    Specimen: Blood   Result Value Ref Range    Total Cholesterol 256 (H) 0 - 200 mg/dL    Triglycerides 208 (H) 0 - 150 mg/dL    HDL Cholesterol 42 40 - 60 mg/dL    LDL Cholesterol  172 (H) 0 - 100 mg/dL    VLDL Cholesterol 41.6 (H) 5 - 40 mg/dL    LDL/HDL Ratio 4.10    7/22/2020:  · CBC: WBC 6.7, RBC 4.66, hemoglobin 14.3, hematocrit 43.3, MCV 93, MCH 30.7, MCHC 33, RDW 12.5, platelets 266, neutrophils 51, lymphocytes 37, monocytes 7, eos 4, basos 1  · CMP: Glucose 95, BUN 12, creatinine 0.53, , sodium 140        Assessment:   Patient with lower extremity swelling and mild shortness of breath on exertion.  We will order an echocardiogram to assess heart structure/function and order a lower extremity venous duplex, proBNP, and D-dimer to rule out heart failure or clot formation.  We will provide her with torsemide 5 mg daily as needed for swelling.  She has known dyslipidemia and we will order a CT cardiac calcium score to assess for coronary calcification.  She is at risk for sleep apnea with snoring and daytime sleepiness; we will screen her with an outpatient sleep oximetry study.     Diagnosis Plan   1. Localized edema  Echocardiogram, lower extremity venous duplex, proBNP, D-dimer, torsemide 5 mg daily as needed   2. Dyslipidemia  No labs to review, continue fish oil, CT cardiac calcium score   3. At risk for sleep apnea  Outpatient sleep oximetry study          Plan:   1. Patient to continue current medications and close follow up with the above providers with the following studies recommended:   A. ProBNP, d dimer   B. Echocardiogram   C. Lower extremity venous duplex   D. CT cardiac calcium score   E. Outpatient sleep oximetry study  2. Torsemide 5mg daily PRN  3. Tentative cardiology follow up in December 2021 or patient may return sooner PRN  4. 1 800 card    Scribed for Godwin Denson MD by BRIANNA Terrell. 10/29/2021  10:11 EDT    I, Godwin Denson MD,  [facility], personally performed the services described in this documentation as scribed by the above named individual in my presence, and it is both accurate and complete. At 10:19 EDT on 10/29/2021

## 2021-10-29 ENCOUNTER — OFFICE VISIT (OUTPATIENT)
Dept: CARDIOLOGY | Facility: CLINIC | Age: 56
End: 2021-10-29

## 2021-10-29 ENCOUNTER — APPOINTMENT (OUTPATIENT)
Dept: MRI IMAGING | Facility: HOSPITAL | Age: 56
End: 2021-10-29

## 2021-10-29 VITALS
HEIGHT: 60 IN | DIASTOLIC BLOOD PRESSURE: 84 MMHG | OXYGEN SATURATION: 97 % | BODY MASS INDEX: 29.64 KG/M2 | SYSTOLIC BLOOD PRESSURE: 137 MMHG | WEIGHT: 151 LBS | HEART RATE: 79 BPM

## 2021-10-29 DIAGNOSIS — R60.0 LOCALIZED EDEMA: Primary | ICD-10-CM

## 2021-10-29 DIAGNOSIS — R06.02 SOB (SHORTNESS OF BREATH): ICD-10-CM

## 2021-10-29 DIAGNOSIS — Z91.89 AT RISK FOR SLEEP APNEA: ICD-10-CM

## 2021-10-29 DIAGNOSIS — E78.5 DYSLIPIDEMIA: ICD-10-CM

## 2021-10-29 PROCEDURE — 93000 ELECTROCARDIOGRAM COMPLETE: CPT | Performed by: INTERNAL MEDICINE

## 2021-10-29 PROCEDURE — 99204 OFFICE O/P NEW MOD 45 MIN: CPT | Performed by: INTERNAL MEDICINE

## 2021-10-29 RX ORDER — HYDROCHLOROTHIAZIDE 25 MG/1
25 TABLET ORAL DAILY
COMMUNITY
Start: 2021-10-11

## 2021-10-29 RX ORDER — CHLORAL HYDRATE 500 MG
1000 CAPSULE ORAL 2 TIMES DAILY WITH MEALS
COMMUNITY

## 2021-10-29 RX ORDER — TORSEMIDE 5 MG/1
5 TABLET ORAL DAILY PRN
Qty: 90 TABLET | Refills: 3 | Status: SHIPPED | OUTPATIENT
Start: 2021-10-29

## 2021-11-05 ENCOUNTER — TELEPHONE (OUTPATIENT)
Dept: CARDIOLOGY | Facility: CLINIC | Age: 56
End: 2021-11-05

## 2021-11-05 DIAGNOSIS — Z91.89 AT RISK FOR SLEEP APNEA: Primary | ICD-10-CM

## 2021-11-05 NOTE — TELEPHONE ENCOUNTER
Called pt regarding overnight oximetry results.     Per [facility]- referral to sleep MD.    Discussed [facility] recommendations above. Pt verbalizes understanding and agreeable to plan.

## 2021-11-09 ENCOUNTER — HOSPITAL ENCOUNTER (OUTPATIENT)
Dept: MRI IMAGING | Facility: HOSPITAL | Age: 56
Discharge: HOME | End: 2021-11-09
Admitting: NURSE PRACTITIONER

## 2021-11-09 DIAGNOSIS — M54.50 LOW BACK PAIN, UNSPECIFIED BACK PAIN LATERALITY, UNSPECIFIED CHRONICITY, UNSPECIFIED WHETHER SCIATICA PRESENT: ICD-10-CM

## 2021-11-09 PROCEDURE — 72148 MRI LUMBAR SPINE W/O DYE: CPT

## 2021-11-09 PROCEDURE — 72148 MRI LUMBAR SPINE W/O DYE: CPT | Performed by: RADIOLOGY

## 2022-08-22 ENCOUNTER — HOSPITAL ENCOUNTER (OUTPATIENT)
Dept: CARDIOLOGY | Facility: HOSPITAL | Age: 57
Discharge: HOME | End: 2022-08-22

## 2022-08-22 ENCOUNTER — LAB (OUTPATIENT)
Dept: LAB | Facility: HOSPITAL | Age: 57
End: 2022-08-22

## 2022-08-22 VITALS — HEIGHT: 60 IN | WEIGHT: 151.01 LBS | BODY MASS INDEX: 29.65 KG/M2

## 2022-08-22 DIAGNOSIS — R60.0 LOCALIZED EDEMA: ICD-10-CM

## 2022-08-22 DIAGNOSIS — R06.02 SOB (SHORTNESS OF BREATH): ICD-10-CM

## 2022-08-22 LAB
ASCENDING AORTA: 2.5 CM
BH CV ECHO MEAS - AO MAX PG: 12 MMHG
BH CV ECHO MEAS - AO MEAN PG: 6 MMHG
BH CV ECHO MEAS - AO ROOT DIAM: 2.4 CM
BH CV ECHO MEAS - AO V2 MAX: 173 CM/SEC
BH CV ECHO MEAS - AO V2 VTI: 38.9 CM
BH CV ECHO MEAS - AVA(I,D): 1.34 CM2
BH CV ECHO MEAS - EDV(CUBED): 117.6 ML
BH CV ECHO MEAS - EDV(MOD-SP2): 61.6 ML
BH CV ECHO MEAS - EDV(MOD-SP4): 64.6 ML
BH CV ECHO MEAS - EF(MOD-BP): 58.5 %
BH CV ECHO MEAS - EF(MOD-SP2): 62.3 %
BH CV ECHO MEAS - EF(MOD-SP4): 56 %
BH CV ECHO MEAS - ESV(CUBED): 29.8 ML
BH CV ECHO MEAS - ESV(MOD-SP2): 23.2 ML
BH CV ECHO MEAS - ESV(MOD-SP4): 28.4 ML
BH CV ECHO MEAS - FS: 36.7 %
BH CV ECHO MEAS - IVS/LVPW: 1 CM
BH CV ECHO MEAS - IVSD: 0.9 CM
BH CV ECHO MEAS - LA DIMENSION: 3.4 CM
BH CV ECHO MEAS - LAT PEAK E' VEL: 12.1 CM/SEC
BH CV ECHO MEAS - LV DIASTOLIC VOL/BSA (35-75): 39 CM2
BH CV ECHO MEAS - LV MASS(C)D: 153 GRAMS
BH CV ECHO MEAS - LV MAX PG: 3.7 MMHG
BH CV ECHO MEAS - LV MEAN PG: 2 MMHG
BH CV ECHO MEAS - LV SYSTOLIC VOL/BSA (12-30): 17.1 CM2
BH CV ECHO MEAS - LV V1 MAX: 96.6 CM/SEC
BH CV ECHO MEAS - LV V1 VTI: 20.5 CM
BH CV ECHO MEAS - LVIDD: 4.9 CM
BH CV ECHO MEAS - LVIDS: 3.1 CM
BH CV ECHO MEAS - LVOT AREA: 2.5 CM2
BH CV ECHO MEAS - LVOT DIAM: 1.8 CM
BH CV ECHO MEAS - LVPWD: 0.9 CM
BH CV ECHO MEAS - MED PEAK E' VEL: 6.7 CM/SEC
BH CV ECHO MEAS - MV A MAX VEL: 113 CM/SEC
BH CV ECHO MEAS - MV DEC SLOPE: 315 CM/SEC2
BH CV ECHO MEAS - MV DEC TIME: 0.22 MSEC
BH CV ECHO MEAS - MV E MAX VEL: 84.4 CM/SEC
BH CV ECHO MEAS - MV E/A: 0.75
BH CV ECHO MEAS - MV MAX PG: 5 MMHG
BH CV ECHO MEAS - MV MEAN PG: 2 MMHG
BH CV ECHO MEAS - MV P1/2T: 97.6 MSEC
BH CV ECHO MEAS - MV V2 VTI: 35.3 CM
BH CV ECHO MEAS - MVA(P1/2T): 2.25 CM2
BH CV ECHO MEAS - MVA(VTI): 1.48 CM2
BH CV ECHO MEAS - PA ACC TIME: 0.11 SEC
BH CV ECHO MEAS - PA PR(ACCEL): 30 MMHG
BH CV ECHO MEAS - RAP SYSTOLE: 3 MMHG
BH CV ECHO MEAS - RVSP: 9 MMHG
BH CV ECHO MEAS - SI(MOD-SP2): 23.2 ML/M2
BH CV ECHO MEAS - SI(MOD-SP4): 21.9 ML/M2
BH CV ECHO MEAS - SV(LVOT): 52.2 ML
BH CV ECHO MEAS - SV(MOD-SP2): 38.4 ML
BH CV ECHO MEAS - SV(MOD-SP4): 36.2 ML
BH CV ECHO MEAS - TAPSE (>1.6): 2.21 CM
BH CV ECHO MEAS - TR MAX PG: 5.7 MMHG
BH CV ECHO MEAS - TR MAX VEL: 119 CM/SEC
BH CV ECHO MEASUREMENTS AVERAGE E/E' RATIO: 8.98
BH CV LOWER VASCULAR LEFT COMMON FEMORAL AUGMENT: NORMAL
BH CV LOWER VASCULAR LEFT COMMON FEMORAL COMPRESS: NORMAL
BH CV LOWER VASCULAR LEFT COMMON FEMORAL PHASIC: NORMAL
BH CV LOWER VASCULAR LEFT COMMON FEMORAL SPONT: NORMAL
BH CV LOWER VASCULAR LEFT DISTAL FEMORAL COMPRESS: NORMAL
BH CV LOWER VASCULAR LEFT GASTRONEMIUS COMPRESS: NORMAL
BH CV LOWER VASCULAR LEFT GREATER SAPH AK COMPRESS: NORMAL
BH CV LOWER VASCULAR LEFT GREATER SAPH BK COMPRESS: NORMAL
BH CV LOWER VASCULAR LEFT LESSER SAPH COMPRESS: NORMAL
BH CV LOWER VASCULAR LEFT MID FEMORAL AUGMENT: NORMAL
BH CV LOWER VASCULAR LEFT MID FEMORAL COMPRESS: NORMAL
BH CV LOWER VASCULAR LEFT MID FEMORAL PHASIC: NORMAL
BH CV LOWER VASCULAR LEFT MID FEMORAL SPONT: NORMAL
BH CV LOWER VASCULAR LEFT PERONEAL COMPRESS: NORMAL
BH CV LOWER VASCULAR LEFT POPLITEAL AUGMENT: NORMAL
BH CV LOWER VASCULAR LEFT POPLITEAL COMPRESS: NORMAL
BH CV LOWER VASCULAR LEFT POPLITEAL PHASIC: NORMAL
BH CV LOWER VASCULAR LEFT POPLITEAL SPONT: NORMAL
BH CV LOWER VASCULAR LEFT POSTERIOR TIBIAL COMPRESS: NORMAL
BH CV LOWER VASCULAR LEFT PROFUNDA FEMORAL COMPRESS: NORMAL
BH CV LOWER VASCULAR LEFT PROXIMAL FEMORAL COMPRESS: NORMAL
BH CV LOWER VASCULAR LEFT SAPHENOFEMORAL JUNCTION COMPRESS: NORMAL
BH CV LOWER VASCULAR RIGHT COMMON FEMORAL AUGMENT: NORMAL
BH CV LOWER VASCULAR RIGHT COMMON FEMORAL COMPRESS: NORMAL
BH CV LOWER VASCULAR RIGHT COMMON FEMORAL PHASIC: NORMAL
BH CV LOWER VASCULAR RIGHT COMMON FEMORAL SPONT: NORMAL
BH CV LOWER VASCULAR RIGHT DISTAL FEMORAL COMPRESS: NORMAL
BH CV LOWER VASCULAR RIGHT GASTRONEMIUS COMPRESS: NORMAL
BH CV LOWER VASCULAR RIGHT GREATER SAPH AK COMPRESS: NORMAL
BH CV LOWER VASCULAR RIGHT GREATER SAPH BK COMPRESS: NORMAL
BH CV LOWER VASCULAR RIGHT LESSER SAPH COMPRESS: NORMAL
BH CV LOWER VASCULAR RIGHT MID FEMORAL AUGMENT: NORMAL
BH CV LOWER VASCULAR RIGHT MID FEMORAL COMPRESS: NORMAL
BH CV LOWER VASCULAR RIGHT MID FEMORAL PHASIC: NORMAL
BH CV LOWER VASCULAR RIGHT MID FEMORAL SPONT: NORMAL
BH CV LOWER VASCULAR RIGHT PERONEAL COMPRESS: NORMAL
BH CV LOWER VASCULAR RIGHT POPLITEAL AUGMENT: NORMAL
BH CV LOWER VASCULAR RIGHT POPLITEAL COMPRESS: NORMAL
BH CV LOWER VASCULAR RIGHT POPLITEAL PHASIC: NORMAL
BH CV LOWER VASCULAR RIGHT POPLITEAL SPONT: NORMAL
BH CV LOWER VASCULAR RIGHT POSTERIOR TIBIAL COMPRESS: NORMAL
BH CV LOWER VASCULAR RIGHT PROFUNDA FEMORAL COMPRESS: NORMAL
BH CV LOWER VASCULAR RIGHT PROXIMAL FEMORAL COMPRESS: NORMAL
BH CV LOWER VASCULAR RIGHT SAPHENOFEMORAL JUNCTION COMPRESS: NORMAL
BH CV VAS BP RIGHT ARM: NORMAL MMHG
BH CV XLRA - RV BASE: 2.9 CM
BH CV XLRA - RV LENGTH: 6 CM
BH CV XLRA - RV MID: 2.3 CM
BH CV XLRA - TDI S': 11.3 CM/SEC
D DIMER PPP FEU-MCNC: <0.27 MCGFEU/ML (ref 0.01–0.5)
LEFT ATRIUM VOLUME INDEX: 24.3 ML/M2
LV EF 2D ECHO EST: 65 %
MAXIMAL PREDICTED HEART RATE: 162 BPM
MAXIMAL PREDICTED HEART RATE: 162 BPM
NT-PROBNP SERPL-MCNC: 16.1 PG/ML (ref 0–900)
STRESS TARGET HR: 138 BPM
STRESS TARGET HR: 138 BPM

## 2022-08-22 PROCEDURE — 36415 COLL VENOUS BLD VENIPUNCTURE: CPT

## 2022-08-22 PROCEDURE — 85379 FIBRIN DEGRADATION QUANT: CPT

## 2022-08-22 PROCEDURE — 83880 ASSAY OF NATRIURETIC PEPTIDE: CPT

## 2022-08-22 PROCEDURE — 93970 EXTREMITY STUDY: CPT | Performed by: INTERNAL MEDICINE

## 2022-08-22 PROCEDURE — 93970 EXTREMITY STUDY: CPT

## 2022-08-22 PROCEDURE — 93306 TTE W/DOPPLER COMPLETE: CPT

## 2022-08-22 PROCEDURE — 93306 TTE W/DOPPLER COMPLETE: CPT | Performed by: INTERNAL MEDICINE

## 2022-08-23 NOTE — PROGRESS NOTES
Subjective:     Encounter Date:08/24/2022    Patient ID: Vidhya Chou is a 56 y.o.  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     SELF REFERRED  HEALTHCARE PROVIDER: BRIANNA Wood/BRIANNA Carmona  UROLOGIST: Ashwin Alvarado MD    Chief Complaint:   Chief Complaint   Patient presents with   • Edema   • DYSLIPIDEMIA       Problem List:  1. Localized lower extremity edema  a. Acceptable ECG October 2021  b. Residual class I symptoms, October 2021, August 2022  c. Acceptable echocardiogram and bilateral venous duplex study, August 2022  2. Dyslipidemia, intolerant to rosuvastatin  3. Occasional GERD  4. At risk for sleep apnea with snoring and daytime sleepiness  5. Covid February 2021 with fatigue, nasal congestion, fever, symptoms lasted for 10 days  6. History of kidney stones and UTI's  7. Surgical history: Tubal ligation  8. COVID-19 infection, January 2022    No Known Allergies    Current Outpatient Medications:   •  famotidine (PEPCID) 20 MG tablet, Take 20 mg by mouth 2 (Two) Times a Day., Disp: , Rfl:   •  fluconazole (DIFLUCAN) 150 MG tablet, Take 150 mg by mouth 1 (One) Time Per Week., Disp: , Rfl:   •  hydroCHLOROthiazide (HYDRODIURIL) 25 MG tablet, Take 25 mg by mouth Daily., Disp: , Rfl:   •  Mirabegron ER (MYRBETRIQ) 25 MG tablet sustained-release 24 hour 24 hr tablet, Take 25 mg by mouth Daily. Takes PRN, Disp: , Rfl:   •  Omega-3 Fatty Acids (fish oil) 1000 MG capsule capsule, Take 1,000 mg by mouth 2 (Two) Times a Day With Meals., Disp: , Rfl:   •  oxybutynin XL (DITROPAN XL) 15 MG 24 hr tablet, Take 15 mg by mouth Daily. Takes PRN, Disp: , Rfl:   •  rosuvastatin (CRESTOR) 10 MG tablet, , Disp: , Rfl:   •  torsemide (DEMADEX) 5 MG tablet, Take 1 tablet by mouth Daily As Needed (swelling)., Disp: 90 tablet, Rfl: 3  •  vitamin D (ERGOCALCIFEROL) 1.25 MG (42802 UT) capsule capsule, Take 50,000 Units by mouth Every 7  "(Seven) Days., Disp: , Rfl:     History of Present Illness: Patient returns after 10-month hiatus for follow up. Patient reports that she had COVID-19 in January 2022. She states that she had to have a tooth extracted due to infection. She states that her weight gain has made it a little harder to breathe. She states that when she had her lower extremity venous duplex, her lower extremities hurt where the ultrasound head was placed. She inquired about if this was related to venous problems. She reports that she will have a pulling sensation in her right neck, which is not associated with soreness. She inquired about if her neck pain could be related to MI symptoms. She has access to support hose, but has not been using them. She has been experiencing lower extremity edema not involving her ankles or feet. She inquired about taking GoLo for weight loss with her current medications. Patient reports that she has not been taking her hydrochlorothiazide. She inquired about seeing the cardiologists at [facility]. She also requested a doctor's note for today's visit and Monday 22 August 2022. Patient denies chest pain, shortness of breath, orthopnea, palpitations, dizziness, and syncope.  She has had no additional interim ER visits, hospitalizations, serious illnesses, or surgeries.     ROS   Obtained and negative except as outlined in problem list and HPI.    Procedures       Objective:       Vitals:    08/24/22 1436 08/24/22 1455   BP: 146/89 150/90   BP Location: Right arm Right arm   Patient Position: Sitting Standing   Pulse: 80 87   SpO2: 97%    Weight: 69.9 kg (154 lb)    Height: 152.2 cm (59.92\")      Body mass index is 30.16 kg/m².  Last weight: 151 lbs    Vitals reviewed.   Constitutional:       Appearance: Well-developed.   Neck:      Thyroid: No thyromegaly.      Vascular: No carotid bruit or JVD.      Lymphadenopathy: No cervical adenopathy.   Pulmonary:      Effort: Pulmonary effort is normal.      Breath " sounds: Normal breath sounds. No wheezing. No rhonchi. No rales.   Cardiovascular:      Regular rhythm.      No gallop. No S3 gallop.   Pulses:     Dorsalis pedis: 2+ bilaterally.     Posterior tibial: 2+ bilaterally.  Edema:     Pretibial: bilateral trace edema of the pretibial area.     Ankle: bilateral trace edema of the ankle.  Abdominal:      Palpations: Abdomen is soft. There is no abdominal mass.      Tenderness: There is no abdominal tenderness.   Musculoskeletal: Normal range of motion. Skin:     General: Skin is warm and dry.      Findings: No rash.   Neurological:      Mental Status: Alert and oriented to person, place, and time.       Lab Review:   · Lab date: 15 Mayra 2022  • FLP: , , HDL 39,   • CMP: Glu 88, BUN 10, Creat 0.56, eGFR 103, Na 138, K 4.2, Cl 105, CO2 26, Ca 9.4  • LFT: Alk Phos 92, AST 19, ALT 17, Bili 0.50, Alb 4.5, Protein 7.1  • CBC: WBC 7.5, RBC 4.67, HGB 14.2, HCT 42.5, MCV 91, MCH 30.4,   • TSH: 2.5  • T3 28  • Total T4 6.0   • Free T4 1.7  • Progesterone <0.50  • Estradiol 53    · Lab date, 22 August 2022  · Pro BNP 16.1  · D-Dimer <0.27    · MRI lumbar spine without contrast, 09 November 2021  · FINDINGS:  · VERTEBRAE:  Grade 1 anterior spondylolisthesis of L5 on S1 vertebral body.  No acute fracture. Probable T11 vertebral body hemangioma.   · SPINAL CORD:  Unremarkable.  Normal signal.  · SOFT TISSUES:  Unremarkable.  · DISCS/SPINAL CANAL/NEURAL FORAMINA:  · L1-L2:  Unremarkable.  No significant disc disease.  No stenosis.  · L2-L3:  Unremarkable.  No significant disc disease.  No stenosis.  · L3-L4:  Unremarkable.  No significant disc disease.  No stenosis.  · L4-L5:  Unremarkable.  No significant disc disease.  No stenosis.  · L5-S1:  Disc desiccation at L5-S1 with posterior disc bulge causing mild central canal stenosis.  · IMPRESSION:  1. Grade 1 anterior spondylolisthesis of L5 on S1 vertebral body.  2. Disc desiccation at L5-S1 with posterior disc  bulge causing mild central canal stenosis.    · Bilateral lower extremity venous duplex, 22 August 2022  · Normal bilateral lower extremity venous duplex scan.    · Echocardiogram, 22 August 2022  · Estimated left ventricular EF = 65% Estimated left ventricular EF was in agreement with the calculated left ventricular EF. Left ventricular ejection fraction appears to be 61 - 65%. Left ventricular systolic function is normal.  · Estimated right ventricular systolic pressure from tricuspid regurgitation is normal (<35 mmHg). Calculated right ventricular systolic pressure from tricuspid regurgitation is 9 mmHg.  · Left ventricular diastolic function is consistent with (grade I) impaired relaxation.  · Normal right ventricular cavity size, wall thickness, systolic function and septal motion noted.  · No evidence of pulmonary hypertension is present.  · There is no evidence of pericardial effusion.  · No significant structural or functional valvular abnormalities demonstrated.        Assessment:       Overall continued acceptable course with no new interim cardiopulmonary complaints with acceptable functional status. We will defer additional diagnostic or therapeutic intervention from a cardiac perspective at this time. She wishes to return on an as necessary basis, and if needing to be seen, to work with Ms. Hauser to be seen at [facility].     Diagnosis Plan   1. Localized edema  Stable and symptomatic. Continue current treatment.   2. Dyslipidemia  Suboptimal control. Continue current treatment, including heart healthy diet, exercise, and cardiac medications.   3. At risk for sleep apnea  Compliance stressed.          Plan:         1. Patient to continue current medications and close follow up with the above providers.  2. Patient is encouraged to implement a regular aerobic exercise routine, at least 30 minutes daily, 4-5 days per week.  3. Patient can initiate a trial of GoLo, and monitor her blood pressure at home, and  call us in 2-3 weeks with the readings.  4. Tentative cardiology follow up PRN.    Scribed for Godwin Denson MD by Zehra Pham. 8/24/2022  15:09 EDT    I, Godwin Denson MD, [facility], personally performed the services described in this documentation as scribed by the above named individual in my presence, and it is both accurate and complete. At 16:03 EDT on 08/24/2022

## 2022-08-24 ENCOUNTER — OFFICE VISIT (OUTPATIENT)
Dept: CARDIOLOGY | Facility: CLINIC | Age: 57
End: 2022-08-24

## 2022-08-24 VITALS
SYSTOLIC BLOOD PRESSURE: 150 MMHG | BODY MASS INDEX: 30.23 KG/M2 | OXYGEN SATURATION: 97 % | HEIGHT: 60 IN | WEIGHT: 154 LBS | DIASTOLIC BLOOD PRESSURE: 90 MMHG | HEART RATE: 87 BPM

## 2022-08-24 DIAGNOSIS — Z91.89 AT RISK FOR SLEEP APNEA: ICD-10-CM

## 2022-08-24 DIAGNOSIS — R60.0 LOCALIZED EDEMA: Primary | ICD-10-CM

## 2022-08-24 DIAGNOSIS — E78.5 DYSLIPIDEMIA: ICD-10-CM

## 2022-08-24 PROCEDURE — 99214 OFFICE O/P EST MOD 30 MIN: CPT | Performed by: INTERNAL MEDICINE

## 2022-08-24 RX ORDER — ROSUVASTATIN CALCIUM 10 MG/1
TABLET, COATED ORAL
COMMUNITY
Start: 2022-08-19

## 2022-08-24 RX ORDER — FLUCONAZOLE 150 MG/1
150 TABLET ORAL WEEKLY
COMMUNITY
Start: 2022-08-17

## 2022-08-24 RX ORDER — ERGOCALCIFEROL 1.25 MG/1
50000 CAPSULE ORAL
COMMUNITY
Start: 2022-08-15

## 2022-08-24 RX ORDER — FAMOTIDINE 20 MG/1
20 TABLET, FILM COATED ORAL 2 TIMES DAILY
COMMUNITY

## 2022-08-24 RX ORDER — OXYBUTYNIN CHLORIDE 15 MG/1
15 TABLET, EXTENDED RELEASE ORAL DAILY
COMMUNITY

## 2025-06-04 ENCOUNTER — OFFICE VISIT (OUTPATIENT)
Dept: CARDIOLOGY | Facility: CLINIC | Age: 60
End: 2025-06-04
Payer: COMMERCIAL

## 2025-06-04 VITALS
OXYGEN SATURATION: 96 % | HEIGHT: 60 IN | BODY MASS INDEX: 31.73 KG/M2 | DIASTOLIC BLOOD PRESSURE: 80 MMHG | HEART RATE: 87 BPM | WEIGHT: 161.6 LBS | RESPIRATION RATE: 16 BRPM | SYSTOLIC BLOOD PRESSURE: 131 MMHG

## 2025-06-04 DIAGNOSIS — R00.2 PALPITATIONS: Primary | ICD-10-CM

## 2025-06-04 NOTE — PROGRESS NOTES
Kendy Hauser APRN  Vidhya Chou  2025    Patient Active Problem List   Diagnosis    Detrusor instability    Kidney stone    Localized edema    Dyslipidemia    At risk for sleep apnea       Dear Kendy Hauser, APRN:    Subjective     Vidhya Chou is a 59 y.o. female with the problems as listed above, presents    Chief complaint: Recurrent palpitations.    History of Present Illness: Ms. Vidhya Chou is a pleasant 59-year-old  female with no history of known heart disease or cardiac arrhythmias, nondiabetic, nonhypertensive, non-smoker, presents with complaints of having recurrent palpitations over the last 8 months or so.  She describes these palpitations as being felt as skipped heartbeats and sometimes rapid heartbeat that makes her feel uncomfortable and short of breath.  These seem to occur at random at different times of the day or night and resolve spontaneously.  They seem to occur about 4 times a week.  She denies any associated dizziness or syncope.      She denies any chest pain or discomfort.  She has dyspnea with moderate exertion with no PND or orthopnea.  She has some leg swelling which she attributes to weight gain of about 40 pounds in the last 5 years.  She also has history of sleep apnea and has CPAP at home but she states that she does not wear it.    Complete Transthoracic Echocardiogram with Complete Doppler and Color Flow    Patient Name: Vidhya Chuo   Patient MRN: [id number]   Patient : 1965 (56 y.o.)   Legal Sex: Female    Accession Number: [id number]   Date of Study: 22   Ordering Provider: Godwin Denson MD    Clinical Indications: Heart Failure, Cardiomyopathy, or Sytemic or Pulmonary Hypertension       Reading Physicians  Performing Staff   Cardiology: Godwin Denson MD     Tech: Marisel Hoffmann RCS          Right Arm   Blood Pressure 136/80 mmHg            [facility] PACS Images     Show images for Adult  Transthoracic Echo Complete w/ Color, Spectral and Contrast if necessary per protocol   Clinical Indication    Heart Failure, Cardiomyopathy, or Sytemic or Pulmonary Hypertension   Dx: Localized edema [R60.0 (ICD-10-CM)]; SOB (shortness of breath) [R06.02 (ICD-10-CM)]     Interpretation Summary  Estimated left ventricular EF = 65% Estimated left ventricular EF was in agreement with the calculated left ventricular EF. Left ventricular ejection fraction appears to be 61 - 65%. Left ventricular systolic function is normal.  Estimated right ventricular systolic pressure from tricuspid regurgitation is normal (<35 mmHg). Calculated right ventricular systolic pressure from tricuspid regurgitation is 9 mmHg.  Left ventricular diastolic function is consistent with (grade I) impaired relaxation.  Normal right ventricular cavity size, wall thickness, systolic function and septal motion noted.  No evidence of pulmonary hypertension is present.  There is no evidence of pericardial effusion.  No significant structural or functional valvular abnormalities demonstrated.     No Known Allergies:    Current Outpatient Medications:     rosuvastatin (CRESTOR) 10 MG tablet, , Disp: , Rfl:     Past Medical History:   Diagnosis Date    Asthma     Hyperlipidemia      Past Surgical History:   Procedure Laterality Date    COLONOSCOPY N/A 9/10/2018    Procedure: COLONOSCOPY;  Surgeon: Marco A Damon MD;  Location: [facility];  Service: Gastroenterology    EYE SURGERY      TUBAL ABDOMINAL LIGATION       Family History   Problem Relation Age of Onset    Hypertension Mother     Heart disease Mother     Diabetes Mother     Kidney disease Mother     Hypertension Father     Heart disease Father     Kidney cancer Father     Heart disease Maternal Grandmother     Heart disease Paternal Grandmother      Social History     Tobacco Use    Smoking status: Passive Smoke Exposure - Never Smoker    Smokeless tobacco: Never   Vaping Use    Vaping status:  "Never Used   Substance Use Topics    Alcohol use: Yes     Comment: occasional    Drug use: No     Review of Systems   Constitutional: Negative for chills, fever, malaise/fatigue, weight gain and weight loss.   HENT:  Positive for ear discharge and ear pain. Negative for congestion and nosebleeds.    Cardiovascular:  Positive for leg swelling and palpitations. Negative for chest pain, irregular heartbeat and orthopnea.   Respiratory:  Positive for shortness of breath and wheezing. Negative for cough and hemoptysis.    Hematologic/Lymphatic:        Hx anemia   Skin:  Positive for color change and dry skin.   Musculoskeletal:  Positive for back pain and stiffness.   Gastrointestinal:  Negative for abdominal pain, change in bowel habit, hematemesis, melena and vomiting.   Genitourinary:  Negative for dysuria, frequency and hematuria.   Neurological:  Negative for focal weakness, headaches, light-headedness and weakness.     Objective   Blood pressure 131/80, pulse 87, resp. rate 16, height 152.4 cm (60\"), weight 73.3 kg (161 lb 9.6 oz), SpO2 96%.  Body mass index is 31.56 kg/m².    Vitals reviewed.   Constitutional:       Appearance: Well-developed.   Eyes:      Conjunctiva/sclera: Conjunctivae normal.   HENT:      Head: Normocephalic.   Neck:      Thyroid: No thyromegaly.      Vascular: No JVD.      Trachea: No tracheal deviation.   Pulmonary:      Effort: No respiratory distress.      Breath sounds: Normal breath sounds. No wheezing. No rales.   Cardiovascular:      PMI at left midclavicular line. Normal rate. Regular rhythm. Normal S1. Normal S2.       Murmurs: There is no murmur.      No gallop.  No click. No rub.   Pulses:     Intact distal pulses.   Edema:     Pretibial: bilateral 1+ edema of the pretibial area.     Ankle: bilateral 1+ edema of the ankle.     Feet: bilateral 1+ edema of the feet.  Abdominal:      General: Bowel sounds are normal.      Palpations: Abdomen is soft. There is no abdominal mass.      " Tenderness: There is no abdominal tenderness.   Musculoskeletal:      Cervical back: Normal range of motion and neck supple. Skin:     General: Skin is warm and dry.   Neurological:      Mental Status: Alert and oriented to person, place, and time.      Cranial Nerves: No cranial nerve deficit.         Lab Results   Component Value Date     12/31/2019    K 4.6 12/31/2019     12/31/2019    CO2 23.8 12/31/2019    BUN 9 12/31/2019    CREATININE 0.66 12/31/2019    GLUCOSE 95 12/31/2019    CALCIUM 9.7 12/31/2019    AST 15 12/31/2019    ALT 29 12/31/2019    ALKPHOS 88 12/31/2019     Lab Results   Component Value Date    CKTOTAL 119 11/14/2024       Lab Results   Component Value Date    INR 1.01 11/11/2024     Lab Results   Component Value Date    TRIG 208 (H) 12/31/2019    HDL 42 12/31/2019     (H) 12/31/2019          ECG 12 Lead    Date/Time: 6/4/2025 2:15 PM  Performed by: Darren Bautista MD    Authorized by: Darren Bautista MD  Comparison: compared with previous ECG from 10/29/2021  Similar to previous ECG  Rhythm: sinus rhythm  Conduction: conduction normal  ST Segments: ST segments normal    Clinical impression: normal ECG            Assessment & Plan    Diagnosis Plan    Palpitations            Recommendations  Orders Placed This Encounter   Procedures    Cardiac Event Monitor ([facility]INCY) or Mobile Cardiac Outpatient Telemetry (MCT)    ECG 12 Lead      Will evaluate further with an event monitor    No follow-ups on file.    As always, Kendy Hauser, BRIANNA  I appreciate very much the opportunity to participate in the cardiovascular care of your patients. Please do not hesitate to call me with any questions with regards to Vidhya Neela Chou's evaluation and management.     With Best Regards,        Darren Bautista MD, [facility]    Please note that portions of this note were completed with a voice recognition program.

## 2025-06-04 NOTE — LETTER
June 4, 2025     BRIANNA Henriquez  [address] [address]    Patient: Vidhya Chou   YOB: 1965   Date of Visit: 6/4/2025     Dear BRIANNA Henriquez:       Thank you for referring Vidhya Chou to me for evaluation. Below are the relevant portions of my assessment and plan of care.    If you have questions, please do not hesitate to call me. I look forward to following Vidhya along with you.         Sincerely,        Darren Bautista MD        CC: No Recipients    Darren Bautista MD  06/04/25 1858  Sign when Signing Visit  Kendy Hauser APRN  Vidhya Chou  1965 06/04/2025    Patient Active Problem List   Diagnosis   • Detrusor instability   • Kidney stone   • Localized edema   • Dyslipidemia   • At risk for sleep apnea       Dear Kendy Hauser APRN:    Subjective    Vidhya Chou is a 59 y.o. female with the problems as listed above, presents    Chief complaint: Recurrent palpitations.    History of Present Illness: Ms. Vidhya Chou is a pleasant 59-year-old  female with no history of known heart disease or cardiac arrhythmias, nondiabetic, nonhypertensive, non-smoker, presents with complaints of having recurrent palpitations over the last 8 months or so.  She describes these palpitations as being felt as skipped heartbeats and sometimes rapid heartbeat that makes her feel uncomfortable and short of breath.  These seem to occur at random at different times of the day or night and resolve spontaneously.  They seem to occur about 4 times a week.  She denies any associated dizziness or syncope.      She denies any chest pain or discomfort.  She has dyspnea with moderate exertion with no PND or orthopnea.  She has some leg swelling which she attributes to weight gain of about 40 pounds in the last 5 years.  She also has history of sleep apnea and has CPAP at home but she states that she does not wear it.    Complete  Transthoracic Echocardiogram with Complete Doppler and Color Flow    Patient Name: Vidhya Chou   Patient MRN: [id number]   Patient : 1965 (56 y.o.)   Legal Sex: Female    Accession Number: [id number]   Date of Study: 22   Ordering Provider: Godwin Denson MD    Clinical Indications: Heart Failure, Cardiomyopathy, or Sytemic or Pulmonary Hypertension       [facility]  Performing Staff   Cardiology: Godwin Denson MD     Tech: Marisel Hoffmann RCS          Right Arm   Blood Pressure 136/80 mmHg            [facility] PACS Images     Show images for Adult Transthoracic Echo Complete w/ Color, Spectral and Contrast if necessary per protocol   Clinical Indication    Heart Failure, Cardiomyopathy, or Sytemic or Pulmonary Hypertension   Dx: Localized edema [R60.0 (ICD-10-CM)]; SOB (shortness of breath) [R06.02 (ICD-10-CM)]     Interpretation Summary  Estimated left ventricular EF = 65% Estimated left ventricular EF was in agreement with the calculated left ventricular EF. Left ventricular ejection fraction appears to be 61 - 65%. Left ventricular systolic function is normal.  Estimated right ventricular systolic pressure from tricuspid regurgitation is normal (<35 mmHg). Calculated right ventricular systolic pressure from tricuspid regurgitation is 9 mmHg.  Left ventricular diastolic function is consistent with (grade I) impaired relaxation.  Normal right ventricular cavity size, wall thickness, systolic function and septal motion noted.  No evidence of pulmonary hypertension is present.  There is no evidence of pericardial effusion.  No significant structural or functional valvular abnormalities demonstrated.     No Known Allergies:    Current Outpatient Medications:   •  rosuvastatin (CRESTOR) 10 MG tablet, , Disp: , Rfl:     Past Medical History:   Diagnosis Date   • Asthma    • Hyperlipidemia      Past Surgical History:   Procedure Laterality Date   • COLONOSCOPY N/A 9/10/2018     "Procedure: COLONOSCOPY;  Surgeon: Marco A Damon MD;  Location: [facility];  Service: Gastroenterology   • EYE SURGERY     • TUBAL ABDOMINAL LIGATION       Family History   Problem Relation Age of Onset   • Hypertension Mother    • Heart disease Mother    • Diabetes Mother    • Kidney disease Mother    • Hypertension Father    • Heart disease Father    • Kidney cancer Father    • Heart disease Maternal Grandmother    • Heart disease Paternal Grandmother      Social History     Tobacco Use   • Smoking status: Passive Smoke Exposure - Never Smoker   • Smokeless tobacco: Never   Vaping Use   • Vaping status: Never Used   Substance Use Topics   • Alcohol use: Yes     Comment: occasional   • Drug use: No     Review of Systems   Constitutional: Negative for chills, fever, malaise/fatigue, weight gain and weight loss.   HENT:  Positive for ear discharge and ear pain. Negative for congestion and nosebleeds.    Cardiovascular:  Positive for leg swelling and palpitations. Negative for chest pain, irregular heartbeat and orthopnea.   Respiratory:  Positive for shortness of breath and wheezing. Negative for cough and hemoptysis.    Hematologic/Lymphatic:        Hx anemia   Skin:  Positive for color change and dry skin.   Musculoskeletal:  Positive for back pain and stiffness.   Gastrointestinal:  Negative for abdominal pain, change in bowel habit, hematemesis, melena and vomiting.   Genitourinary:  Negative for dysuria, frequency and hematuria.   Neurological:  Negative for focal weakness, headaches, light-headedness and weakness.     Objective  Blood pressure 131/80, pulse 87, resp. rate 16, height 152.4 cm (60\"), weight 73.3 kg (161 lb 9.6 oz), SpO2 96%.  Body mass index is 31.56 kg/m².    Vitals reviewed.   Constitutional:       Appearance: Well-developed.   Eyes:      Conjunctiva/sclera: Conjunctivae normal.   HENT:      Head: Normocephalic.   Neck:      Thyroid: No thyromegaly.      Vascular: No JVD.      Trachea: No " tracheal deviation.   Pulmonary:      Effort: No respiratory distress.      Breath sounds: Normal breath sounds. No wheezing. No rales.   Cardiovascular:      PMI at left midclavicular line. Normal rate. Regular rhythm. Normal S1. Normal S2.       Murmurs: There is no murmur.      No gallop.  No click. No rub.   Pulses:     Intact distal pulses.   Edema:     Pretibial: bilateral 1+ edema of the pretibial area.     Ankle: bilateral 1+ edema of the ankle.     Feet: bilateral 1+ edema of the feet.  Abdominal:      General: Bowel sounds are normal.      Palpations: Abdomen is soft. There is no abdominal mass.      Tenderness: There is no abdominal tenderness.   Musculoskeletal:      Cervical back: Normal range of motion and neck supple. Skin:     General: Skin is warm and dry.   Neurological:      Mental Status: Alert and oriented to person, place, and time.      Cranial Nerves: No cranial nerve deficit.         Lab Results   Component Value Date     12/31/2019    K 4.6 12/31/2019     12/31/2019    CO2 23.8 12/31/2019    BUN 9 12/31/2019    CREATININE 0.66 12/31/2019    GLUCOSE 95 12/31/2019    CALCIUM 9.7 12/31/2019    AST 15 12/31/2019    ALT 29 12/31/2019    ALKPHOS 88 12/31/2019     Lab Results   Component Value Date    CKTOTAL 119 11/14/2024       Lab Results   Component Value Date    INR 1.01 11/11/2024     Lab Results   Component Value Date    TRIG 208 (H) 12/31/2019    HDL 42 12/31/2019     (H) 12/31/2019          ECG 12 Lead    Date/Time: 6/4/2025 2:15 PM  Performed by: Darren Bautista MD    Authorized by: Darren Bautista MD  Comparison: compared with previous ECG from 10/29/2021  Similar to previous ECG  Rhythm: sinus rhythm  Conduction: conduction normal  ST Segments: ST segments normal    Clinical impression: normal ECG            Assessment & Plan   Diagnosis Plan    Palpitations            Recommendations  Orders Placed This Encounter   Procedures   • Cardiac Event Monitor (QUINCY) or  Mobile Cardiac Outpatient Telemetry (MCT)   • ECG 12 Lead      Will evaluate further with an event monitor    No follow-ups on file.    As always, Kendy  I appreciate very much the opportunity to participate in the cardiovascular care of your patients. Please do not hesitate to call me with any questions with regards to Vidhya Chou's evaluation and management.     With Best Regards,        Darren Bautista MD, [facility]    Please note that portions of this note were completed with a voice recognition program.

## 2025-07-23 ENCOUNTER — OFFICE VISIT (OUTPATIENT)
Dept: CARDIOLOGY | Facility: CLINIC | Age: 60
End: 2025-07-23
Payer: COMMERCIAL

## 2025-07-23 VITALS
OXYGEN SATURATION: 98 % | BODY MASS INDEX: 31.56 KG/M2 | HEART RATE: 72 BPM | HEIGHT: 60 IN | DIASTOLIC BLOOD PRESSURE: 82 MMHG | SYSTOLIC BLOOD PRESSURE: 150 MMHG

## 2025-07-23 DIAGNOSIS — I49.3 SYMPTOMATIC PVCS: Primary | ICD-10-CM

## 2025-07-23 DIAGNOSIS — I20.89 ANGINAL EQUIVALENT: ICD-10-CM

## 2025-07-23 PROCEDURE — 99214 OFFICE O/P EST MOD 30 MIN: CPT | Performed by: NURSE PRACTITIONER

## 2025-07-23 RX ORDER — FLUTICASONE PROPIONATE 50 MCG
1 SPRAY, SUSPENSION (ML) NASAL AS NEEDED
COMMUNITY

## 2025-07-23 NOTE — PROGRESS NOTES
Brittaney Mobley APRN  Vidhya Chou  1965 07/23/2025    Patient Active Problem List   Diagnosis    Detrusor instability    Kidney stone    Localized edema    Dyslipidemia    At risk for sleep apnea       Dear Brittaney Mobley APRN:    Subjective     Chief Complaint   Patient presents with    Follow-up     6 week / results event monitor         History of Present Illness:    Vidhya Chou is a 59 y.o. female with a past medical history of hyperlipidemia and palpitations.  She recently wore an event monitor which revealed predominantly sinus rhythm with a PVC burden of 3%.  Her symptoms correlate with PVCs.  She reports she had been drinking electrolyte drinks.  Since quitting these drinks, her palpitations seem to have resolved.  She is very concerned about coronary artery disease.  She reports her brother recently had to have a stent.  Both of her parents had heart attacks.  She has shortness of breath and fatigue with mild exertion.  She is concerned this is related to a blockage although she does report she has gained weight and this could be contributing as well.          No Known Allergies:      Current Outpatient Medications:     fluticasone (FLONASE) 50 MCG/ACT nasal spray, Administer 1 spray into the nostril(s) as directed by provider As Needed., Disp: , Rfl:     rosuvastatin (CRESTOR) 10 MG tablet, , Disp: , Rfl:       The following portions of the patient's history were reviewed and updated as appropriate: allergies, current medications, past family history, past medical history, past social history, past surgical history and problem list.    Social History     Tobacco Use    Smoking status: Never     Passive exposure: Yes    Smokeless tobacco: Never   Vaping Use    Vaping status: Never Used   Substance Use Topics    Alcohol use: Yes     Comment: occasional    Drug use: No       Review of Systems   Constitutional: Negative for decreased appetite and malaise/fatigue.  "  Cardiovascular:  Positive for dyspnea on exertion. Negative for chest pain and palpitations.   Respiratory:  Negative for cough and shortness of breath.        Objective   Vitals:    07/23/25 0850   BP: 150/82   BP Location: Left arm   Patient Position: Sitting   Cuff Size: Adult   Pulse: 72   SpO2: 98%   Height: 152.4 cm (60\")     Body mass index is 31.56 kg/m².        Vitals reviewed.   Constitutional:       Appearance: Healthy appearance. Well-developed and not in distress.   HENT:      Head: Normocephalic and atraumatic.   Neck:      Vascular: No carotid bruit or JVD.   Pulmonary:      Effort: Pulmonary effort is normal.      Breath sounds: Normal breath sounds. No wheezing. No rales.   Cardiovascular:      Normal rate. Regular rhythm.      Murmurs: There is no murmur.      . No S3 and S4 gallop.   Edema:     Peripheral edema absent.   Abdominal:      General: Bowel sounds are normal.      Palpations: Abdomen is soft.   Skin:     General: Skin is warm and dry.   Neurological:      Mental Status: Alert, oriented to person, place, and time and oriented to person, place and time.   Psychiatric:         Mood and Affect: Mood normal.         Behavior: Behavior normal.         Lab Results   Component Value Date     12/31/2019    K 4.6 12/31/2019     12/31/2019    CO2 23.8 12/31/2019    BUN 9 12/31/2019    CREATININE 0.66 12/31/2019    GLUCOSE 95 12/31/2019    CALCIUM 9.7 12/31/2019    AST 15 12/31/2019    ALT 29 12/31/2019    ALKPHOS 88 12/31/2019     No results found for: \"WBC\", \"HGB\", \"HCT\", \"PLT\"  Lab Results   Component Value Date    TRIG 208 (H) 12/31/2019    HDL 42 12/31/2019     (H) 12/31/2019          Results for orders placed during the hospital encounter of 08/22/22    Adult Transthoracic Echo Complete w/ Color, Spectral and Contrast if necessary per protocol 08/22/2022  5:02 PM    Interpretation Summary  · Estimated left ventricular EF = 65% Estimated left ventricular EF was in " agreement with the calculated left ventricular EF. Left ventricular ejection fraction appears to be 61 - 65%. Left ventricular systolic function is normal.  · Estimated right ventricular systolic pressure from tricuspid regurgitation is normal (<35 mmHg). Calculated right ventricular systolic pressure from tricuspid regurgitation is 9 mmHg.  · Left ventricular diastolic function is consistent with (grade I) impaired relaxation.  · Normal right ventricular cavity size, wall thickness, systolic function and septal motion noted.  · No evidence of pulmonary hypertension is present.  · There is no evidence of pericardial effusion.  · No significant structural or functional valvular abnormalities demonstrated.              Procedures    Assessment & Plan    Diagnosis Plan   1. Symptomatic PVCs  Stress Test With Myocardial Perfusion One Day      2. Anginal equivalent  Stress Test With Myocardial Perfusion One Day               Recommendations:  Frequent PVCs-patient reports improvement since discontinuing electrolyte drink. Will not add beta blocker at this time since symptoms have improved.   Anginal equivalent - exertional dyspnea and fatigue could be anginal equivalent. Will order a nuclear treadmill stress test to rule out myocardial ischemia.  Follow up in 6 weeks or sooner if needed.       Return in about 6 weeks (around 9/3/2025) for Recheck.    As always, I appreciate very much the opportunity to participate in the cardiovascular care of your patients.      With Best Regards,    BRIANNA Tran

## (undated) DEVICE — SINGLE PORT MANIFOLD: Brand: NEPTUNE 2

## (undated) DEVICE — SYR LUERLOK 30CC

## (undated) DEVICE — SUCTION CANISTER, 1500CC, RIGID: Brand: DEROYAL

## (undated) DEVICE — Device: Brand: ENDOGATOR

## (undated) DEVICE — TUBING, SUCTION, 1/4" X 20', STRAIGHT: Brand: MEDLINE INDUSTRIES, INC.

## (undated) DEVICE — Device

## (undated) DEVICE — Device: Brand: DEFENDO AIR/WATER/SUCTION AND BIOPSY VALVE

## (undated) DEVICE — GOWN,REINF,POLY,ECL,PP SLV,XL: Brand: MEDLINE

## (undated) DEVICE — CONN Y IRR DISP 1P/U